# Patient Record
Sex: FEMALE | Race: WHITE | NOT HISPANIC OR LATINO | Employment: FULL TIME | URBAN - METROPOLITAN AREA
[De-identification: names, ages, dates, MRNs, and addresses within clinical notes are randomized per-mention and may not be internally consistent; named-entity substitution may affect disease eponyms.]

---
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
## 2023-08-01 ENCOUNTER — OFFICE VISIT (OUTPATIENT)
Dept: OBSTETRICS AND GYNECOLOGY | Facility: CLINIC | Age: 23
End: 2023-08-01
Payer: COMMERCIAL

## 2023-08-01 VITALS
DIASTOLIC BLOOD PRESSURE: 70 MMHG | HEART RATE: 94 BPM | WEIGHT: 179.38 LBS | SYSTOLIC BLOOD PRESSURE: 116 MMHG | HEIGHT: 65 IN | BODY MASS INDEX: 29.89 KG/M2

## 2023-08-01 DIAGNOSIS — Z30.430 ENCOUNTER FOR INSERTION OF PARAGARD IUD: ICD-10-CM

## 2023-08-01 DIAGNOSIS — Z12.4 CERVICAL CANCER SCREENING: ICD-10-CM

## 2023-08-01 DIAGNOSIS — Z01.419 ENCNTR FOR GYN EXAM (GENERAL) (ROUTINE) W/O ABN FINDINGS: Primary | ICD-10-CM

## 2023-08-01 PROCEDURE — 99999 PR PBB SHADOW E&M-EST. PATIENT-LVL III: CPT | Mod: PBBFAC,,, | Performed by: STUDENT IN AN ORGANIZED HEALTH CARE EDUCATION/TRAINING PROGRAM

## 2023-08-01 PROCEDURE — 3008F PR BODY MASS INDEX (BMI) DOCUMENTED: ICD-10-PCS | Mod: CPTII,S$GLB,, | Performed by: STUDENT IN AN ORGANIZED HEALTH CARE EDUCATION/TRAINING PROGRAM

## 2023-08-01 PROCEDURE — 99385 PR PREVENTIVE VISIT,NEW,18-39: ICD-10-PCS | Mod: S$GLB,,, | Performed by: STUDENT IN AN ORGANIZED HEALTH CARE EDUCATION/TRAINING PROGRAM

## 2023-08-01 PROCEDURE — 99999 PR PBB SHADOW E&M-EST. PATIENT-LVL III: ICD-10-PCS | Mod: PBBFAC,,, | Performed by: STUDENT IN AN ORGANIZED HEALTH CARE EDUCATION/TRAINING PROGRAM

## 2023-08-01 PROCEDURE — 3008F BODY MASS INDEX DOCD: CPT | Mod: CPTII,S$GLB,, | Performed by: STUDENT IN AN ORGANIZED HEALTH CARE EDUCATION/TRAINING PROGRAM

## 2023-08-01 PROCEDURE — 99385 PREV VISIT NEW AGE 18-39: CPT | Mod: S$GLB,,, | Performed by: STUDENT IN AN ORGANIZED HEALTH CARE EDUCATION/TRAINING PROGRAM

## 2023-08-01 PROCEDURE — 1160F RVW MEDS BY RX/DR IN RCRD: CPT | Mod: CPTII,S$GLB,, | Performed by: STUDENT IN AN ORGANIZED HEALTH CARE EDUCATION/TRAINING PROGRAM

## 2023-08-01 PROCEDURE — 3074F PR MOST RECENT SYSTOLIC BLOOD PRESSURE < 130 MM HG: ICD-10-PCS | Mod: CPTII,S$GLB,, | Performed by: STUDENT IN AN ORGANIZED HEALTH CARE EDUCATION/TRAINING PROGRAM

## 2023-08-01 PROCEDURE — 3074F SYST BP LT 130 MM HG: CPT | Mod: CPTII,S$GLB,, | Performed by: STUDENT IN AN ORGANIZED HEALTH CARE EDUCATION/TRAINING PROGRAM

## 2023-08-01 PROCEDURE — 1159F MED LIST DOCD IN RCRD: CPT | Mod: CPTII,S$GLB,, | Performed by: STUDENT IN AN ORGANIZED HEALTH CARE EDUCATION/TRAINING PROGRAM

## 2023-08-01 PROCEDURE — 3078F DIAST BP <80 MM HG: CPT | Mod: CPTII,S$GLB,, | Performed by: STUDENT IN AN ORGANIZED HEALTH CARE EDUCATION/TRAINING PROGRAM

## 2023-08-01 PROCEDURE — 1159F PR MEDICATION LIST DOCUMENTED IN MEDICAL RECORD: ICD-10-PCS | Mod: CPTII,S$GLB,, | Performed by: STUDENT IN AN ORGANIZED HEALTH CARE EDUCATION/TRAINING PROGRAM

## 2023-08-01 PROCEDURE — 88175 CYTOPATH C/V AUTO FLUID REDO: CPT | Performed by: PATHOLOGY

## 2023-08-01 PROCEDURE — 88141 CYTOPATH C/V INTERPRET: CPT | Mod: ,,, | Performed by: PATHOLOGY

## 2023-08-01 PROCEDURE — 88141 PR  CYTOPATH CERV/VAG INTERPRET: ICD-10-PCS | Mod: ,,, | Performed by: PATHOLOGY

## 2023-08-01 PROCEDURE — 3078F PR MOST RECENT DIASTOLIC BLOOD PRESSURE < 80 MM HG: ICD-10-PCS | Mod: CPTII,S$GLB,, | Performed by: STUDENT IN AN ORGANIZED HEALTH CARE EDUCATION/TRAINING PROGRAM

## 2023-08-01 PROCEDURE — 1160F PR REVIEW ALL MEDS BY PRESCRIBER/CLIN PHARMACIST DOCUMENTED: ICD-10-PCS | Mod: CPTII,S$GLB,, | Performed by: STUDENT IN AN ORGANIZED HEALTH CARE EDUCATION/TRAINING PROGRAM

## 2023-08-01 RX ORDER — ETONOGESTREL AND ETHINYL ESTRADIOL VAGINAL RING .015; .12 MG/D; MG/D
RING VAGINAL
COMMUNITY
Start: 2023-03-25 | End: 2023-11-16

## 2023-08-01 NOTE — PROGRESS NOTES
Subjective:    Patient ID: Yanira Sun is a 23 y.o. y.o. female.     Chief Complaint: Annual Well Woman Exam     History of Present Illness:  Yanira presents today for Annual Well Woman exam. She describes her menses as regular every month without intermenstrual spotting.She denies pelvic pain.  She denies breast tenderness, masses, nipple discharge. She denies difficulty with urination or bowel movements. . She is sexually active. Contraception is by no method.      Menstrual History:   Patient's last menstrual period was 2023..     OB History          0    Para   0    Term   0       0    AB   0    Living   0         SAB   0    IAB   0    Ectopic   0    Multiple   0    Live Births   0                 The following portions of the patient's history were reviewed and updated as appropriate: allergies, current medications, past family history, past medical history, past social history, past surgical history, and problem list.    ROS:   CONSTITUTIONAL: Negative for fever, chills, diaphoresis, weakness, fatigue, weight loss, weight gain  ENT: negative for sore throat, nasal congestion, nasal discharge, epistaxis, tinnitus, hearing loss  EYES: negative for blurry vision, decreased vision, loss of vision, eye pain, diplopia, photophobia, discharge  SKIN: Negative for rash, itching, hives  RESPIRATORY: negative for cough, hemoptysis, shortness of breath, pleuritic chest pain, wheezing  CARDIOVASCULAR: negative for chest pain, dyspnea on exertion, orthopnea, paroxysmal nocturnal dyspnea, edema, palpitations  BREAST: negative for breast  tenderness, breast mass, nipple discharge, or skin changes  GASTROINTESTINAL: negative for abdominal pain, flank pain, nausea, vomiting, diarrhea, constipation, black stool, blood in stool  GENITOURINARY: negative for abnormal vaginal bleeding, amenorrhea, decreased libido, dysuria, genital sores, hematuria, incontinence, menorrhagia, pelvic pain, urinary frequency,  vaginal discharge  HEMATOLOGIC/LYMPHATIC: negative for swollen lymph nodes, bleeding, bruising  MUSCULOSKELETAL: negative for back pain, joint pain, joint stiffness, joint swelling, muscle pain, muscle weakness  NEUROLOGICAL: negative for dizzy/vertigo, headache, focal weakness, numbness/tingling, speech problems, loss of consciousness, confusion, memory loss  BEHAVORIAL/PSYCH: negative for anxiety, depression, psychosis  ENDOCRINE: negative for polydipsia/polyuria, palpitations, skin changes, temperature intolerance, unexpected weight changes  ALLERGIC/IMMUNOLOGIC: negative for urticaria, hay fever, angioedema      Objective:    Vital Signs:  Vitals:    08/01/23 1306   BP: 116/70   Pulse: 94       Physical Exam:  General:  alert, cooperative, no distress   Skin:  Skin color, texture, turgor normal. No rashes or lesions   HEENT:  conjunctivae/corneas clear. PERRL.   Neck: supple, trachea midline, no adenopathy or thyromegally   Respiratory:  Normal effort   Breasts:  no discharge, erythema, tenderness, or palpable masses; no axillary lymphadenopathy   Abdomen:  soft, nontender, no palpable masses   Pelvis: External genitalia: normal general appearance  Urinary system: urethral meatus normal, bladder nontender  Vaginal: normal mucosa without prolapse or lesions  Cervix: normal appearance  Uterus: normal size, shape, position  Adnexa: normal size, nontender bilaterally   Extremities: Normal ROM; no edema, no cyanosis   Neurologial: Normal strength and tone. No focal numbness or weakness.   Psychiatric: normal mood, speech, dress, and thought processes     LABS:  No result found     A1C:      CBC:  Recent Labs   Lab 06/17/23  0354   WBC 15.53 H   RBC 5.23   Hemoglobin 15.0   Hematocrit 42.3   Platelets 423   MCV 81 L   MCH 28.7   MCHC 35.5     CMP:  Recent Labs   Lab 06/17/23  0354   Glucose 126 H   Calcium 9.7   Albumin 4.9   Total Protein 8.5 H   Sodium 140   Potassium 3.9   CO2 18 L   Chloride 108   BUN 13    Creatinine 0.59   Alkaline Phosphatase 80   ALT 29   AST 29   Total Bilirubin 0.6     LIPIDS:      TSH:        Assessment:       Healthy female exam.     1. Encntr for gyn exam (general) (routine) w/o abn findings    2. Cervical cancer screening          Plan:      Problem List Items Addressed This Visit    None  Visit Diagnoses       Encntr for gyn exam (general) (routine) w/o abn findings    -  Primary    Cervical cancer screening        Relevant Orders    Liquid-Based Pap Smear, Screening              COUNSELING:  Yanira was counseled on STD prevention, use and side-effects of various contraceptive measures, A.C.O.G. Pap guidelines and recommendations for yearly pelvic exams in addition to recommendations for monthly self breast exams; to see her PCP for other health maintenance.

## 2023-08-08 LAB
FINAL PATHOLOGIC DIAGNOSIS: NORMAL
Lab: NORMAL

## 2023-08-11 ENCOUNTER — TELEPHONE (OUTPATIENT)
Dept: OBSTETRICS AND GYNECOLOGY | Facility: CLINIC | Age: 23
End: 2023-08-11
Payer: COMMERCIAL

## 2023-08-30 ENCOUNTER — PROCEDURE VISIT (OUTPATIENT)
Dept: OBSTETRICS AND GYNECOLOGY | Facility: CLINIC | Age: 23
End: 2023-08-30
Payer: COMMERCIAL

## 2023-08-30 VITALS
WEIGHT: 181 LBS | HEIGHT: 65 IN | BODY MASS INDEX: 30.16 KG/M2 | DIASTOLIC BLOOD PRESSURE: 68 MMHG | SYSTOLIC BLOOD PRESSURE: 116 MMHG | HEART RATE: 95 BPM

## 2023-08-30 DIAGNOSIS — Z30.430 ENCOUNTER FOR INSERTION OF PARAGARD IUD: Primary | ICD-10-CM

## 2023-08-30 LAB
B-HCG UR QL: NEGATIVE
CTP QC/QA: YES

## 2023-08-30 PROCEDURE — 58300 INSERT INTRAUTERINE DEVICE: CPT | Mod: S$GLB,,, | Performed by: STUDENT IN AN ORGANIZED HEALTH CARE EDUCATION/TRAINING PROGRAM

## 2023-08-30 PROCEDURE — 99499 UNLISTED E&M SERVICE: CPT | Mod: S$GLB,,, | Performed by: STUDENT IN AN ORGANIZED HEALTH CARE EDUCATION/TRAINING PROGRAM

## 2023-08-30 PROCEDURE — 99499 NO LOS: ICD-10-PCS | Mod: S$GLB,,, | Performed by: STUDENT IN AN ORGANIZED HEALTH CARE EDUCATION/TRAINING PROGRAM

## 2023-08-30 PROCEDURE — 81025 URINE PREGNANCY TEST: CPT | Mod: S$GLB,,, | Performed by: STUDENT IN AN ORGANIZED HEALTH CARE EDUCATION/TRAINING PROGRAM

## 2023-08-30 PROCEDURE — 58300 INSERTION OF IUD: ICD-10-PCS | Mod: S$GLB,,, | Performed by: STUDENT IN AN ORGANIZED HEALTH CARE EDUCATION/TRAINING PROGRAM

## 2023-08-30 PROCEDURE — 81025 POCT URINE PREGNANCY: ICD-10-PCS | Mod: S$GLB,,, | Performed by: STUDENT IN AN ORGANIZED HEALTH CARE EDUCATION/TRAINING PROGRAM

## 2023-08-30 NOTE — PROCEDURES
Insertion of IUD    Date/Time: 8/30/2023 10:45 AM    Performed by: Latoya Villa MD  Authorized by: Latoya Villa MD    Consent:     Consent given by:  Patient    Procedure risks and benefits discussed: yes      Patient questions answered: yes      Patient agrees, verbalizes understanding, and wants to proceed: yes      Educational handouts given: yes      Instructions and paperwork completed: yes    Procedure:     Pelvic exam performed: yes      Negative serum pregnancy test: no      Speculum placed in vagina: yes      Tenaculum applied to cervix: yes      Uterus sounded: yes      Uterus sound depth (cm):  7    IUD inserted with no complications: yes      IUD type:  ParaGard    Strings trimmed: yes    380 mm copper intrauterine device 380 square mm     Post-procedure:     Patient tolerated procedure well: yes      Patient will follow up after next period: yes

## 2023-09-27 ENCOUNTER — OFFICE VISIT (OUTPATIENT)
Dept: OBSTETRICS AND GYNECOLOGY | Facility: CLINIC | Age: 23
End: 2023-09-27
Payer: COMMERCIAL

## 2023-09-27 VITALS
SYSTOLIC BLOOD PRESSURE: 118 MMHG | HEART RATE: 89 BPM | WEIGHT: 179.88 LBS | BODY MASS INDEX: 29.97 KG/M2 | HEIGHT: 65 IN | DIASTOLIC BLOOD PRESSURE: 70 MMHG

## 2023-09-27 DIAGNOSIS — Z30.431 IUD CHECK UP: Primary | ICD-10-CM

## 2023-09-27 PROCEDURE — 3008F BODY MASS INDEX DOCD: CPT | Mod: CPTII,S$GLB,, | Performed by: STUDENT IN AN ORGANIZED HEALTH CARE EDUCATION/TRAINING PROGRAM

## 2023-09-27 PROCEDURE — 3078F PR MOST RECENT DIASTOLIC BLOOD PRESSURE < 80 MM HG: ICD-10-PCS | Mod: CPTII,S$GLB,, | Performed by: STUDENT IN AN ORGANIZED HEALTH CARE EDUCATION/TRAINING PROGRAM

## 2023-09-27 PROCEDURE — 99999 PR PBB SHADOW E&M-EST. PATIENT-LVL III: ICD-10-PCS | Mod: PBBFAC,,, | Performed by: STUDENT IN AN ORGANIZED HEALTH CARE EDUCATION/TRAINING PROGRAM

## 2023-09-27 PROCEDURE — 99213 PR OFFICE/OUTPT VISIT, EST, LEVL III, 20-29 MIN: ICD-10-PCS | Mod: S$GLB,,, | Performed by: STUDENT IN AN ORGANIZED HEALTH CARE EDUCATION/TRAINING PROGRAM

## 2023-09-27 PROCEDURE — 1159F MED LIST DOCD IN RCRD: CPT | Mod: CPTII,S$GLB,, | Performed by: STUDENT IN AN ORGANIZED HEALTH CARE EDUCATION/TRAINING PROGRAM

## 2023-09-27 PROCEDURE — 3074F PR MOST RECENT SYSTOLIC BLOOD PRESSURE < 130 MM HG: ICD-10-PCS | Mod: CPTII,S$GLB,, | Performed by: STUDENT IN AN ORGANIZED HEALTH CARE EDUCATION/TRAINING PROGRAM

## 2023-09-27 PROCEDURE — 3078F DIAST BP <80 MM HG: CPT | Mod: CPTII,S$GLB,, | Performed by: STUDENT IN AN ORGANIZED HEALTH CARE EDUCATION/TRAINING PROGRAM

## 2023-09-27 PROCEDURE — 99999 PR PBB SHADOW E&M-EST. PATIENT-LVL III: CPT | Mod: PBBFAC,,, | Performed by: STUDENT IN AN ORGANIZED HEALTH CARE EDUCATION/TRAINING PROGRAM

## 2023-09-27 PROCEDURE — 1159F PR MEDICATION LIST DOCUMENTED IN MEDICAL RECORD: ICD-10-PCS | Mod: CPTII,S$GLB,, | Performed by: STUDENT IN AN ORGANIZED HEALTH CARE EDUCATION/TRAINING PROGRAM

## 2023-09-27 PROCEDURE — 3074F SYST BP LT 130 MM HG: CPT | Mod: CPTII,S$GLB,, | Performed by: STUDENT IN AN ORGANIZED HEALTH CARE EDUCATION/TRAINING PROGRAM

## 2023-09-27 PROCEDURE — 99213 OFFICE O/P EST LOW 20 MIN: CPT | Mod: S$GLB,,, | Performed by: STUDENT IN AN ORGANIZED HEALTH CARE EDUCATION/TRAINING PROGRAM

## 2023-09-27 PROCEDURE — 3008F PR BODY MASS INDEX (BMI) DOCUMENTED: ICD-10-PCS | Mod: CPTII,S$GLB,, | Performed by: STUDENT IN AN ORGANIZED HEALTH CARE EDUCATION/TRAINING PROGRAM

## 2023-09-27 NOTE — PROGRESS NOTES
CC: IUD check    Yanira Sun is a 23 y.o. female  presents for IUD check.  Patient had a Paragard placed 4 week(s) ago.  She is not having problems with bleeding, cramping, fever or discharge.  She is able to feel the strings.      PHYSICAL EXAM:  Vitals:    23 1101   BP: 118/70   Pulse: 89         GENERAL: alert, appears stated age, and cooperative  ABDOMEN:  Normal, benign.  EXTERNAL GENITALIA: normal appearing vulva with no masses, tenderness or lesions  URETHRA:  Normal  URETHRAL MEATUS:  Normal  VAGINA:  not evaluated  CERVIX: nulliparous appearance IUD strings are visible.  IUD strings are palpable.    UTERUS:  nonenlarged  ADNEXA:  not evaluated    ASSESSMENT AND PLAN:  IUD check    Patient counseled on IUD danger signs and how to check the strings reviewed.

## 2023-11-16 ENCOUNTER — TELEPHONE (OUTPATIENT)
Dept: OBSTETRICS AND GYNECOLOGY | Facility: CLINIC | Age: 23
End: 2023-11-16
Payer: COMMERCIAL

## 2023-11-16 ENCOUNTER — OFFICE VISIT (OUTPATIENT)
Dept: OBSTETRICS AND GYNECOLOGY | Facility: CLINIC | Age: 23
End: 2023-11-16
Payer: COMMERCIAL

## 2023-11-16 VITALS
HEART RATE: 85 BPM | BODY MASS INDEX: 30.91 KG/M2 | WEIGHT: 185.5 LBS | SYSTOLIC BLOOD PRESSURE: 118 MMHG | DIASTOLIC BLOOD PRESSURE: 68 MMHG | HEIGHT: 65 IN

## 2023-11-16 DIAGNOSIS — N94.6 DYSMENORRHEA: Primary | ICD-10-CM

## 2023-11-16 PROCEDURE — 99999 PR PBB SHADOW E&M-EST. PATIENT-LVL III: CPT | Mod: PBBFAC,,, | Performed by: STUDENT IN AN ORGANIZED HEALTH CARE EDUCATION/TRAINING PROGRAM

## 2023-11-16 PROCEDURE — 3074F PR MOST RECENT SYSTOLIC BLOOD PRESSURE < 130 MM HG: ICD-10-PCS | Mod: CPTII,S$GLB,, | Performed by: STUDENT IN AN ORGANIZED HEALTH CARE EDUCATION/TRAINING PROGRAM

## 2023-11-16 PROCEDURE — 3078F PR MOST RECENT DIASTOLIC BLOOD PRESSURE < 80 MM HG: ICD-10-PCS | Mod: CPTII,S$GLB,, | Performed by: STUDENT IN AN ORGANIZED HEALTH CARE EDUCATION/TRAINING PROGRAM

## 2023-11-16 PROCEDURE — 3008F PR BODY MASS INDEX (BMI) DOCUMENTED: ICD-10-PCS | Mod: CPTII,S$GLB,, | Performed by: STUDENT IN AN ORGANIZED HEALTH CARE EDUCATION/TRAINING PROGRAM

## 2023-11-16 PROCEDURE — 99213 PR OFFICE/OUTPT VISIT, EST, LEVL III, 20-29 MIN: ICD-10-PCS | Mod: S$GLB,,, | Performed by: STUDENT IN AN ORGANIZED HEALTH CARE EDUCATION/TRAINING PROGRAM

## 2023-11-16 PROCEDURE — 99213 OFFICE O/P EST LOW 20 MIN: CPT | Mod: S$GLB,,, | Performed by: STUDENT IN AN ORGANIZED HEALTH CARE EDUCATION/TRAINING PROGRAM

## 2023-11-16 PROCEDURE — 1159F MED LIST DOCD IN RCRD: CPT | Mod: CPTII,S$GLB,, | Performed by: STUDENT IN AN ORGANIZED HEALTH CARE EDUCATION/TRAINING PROGRAM

## 2023-11-16 PROCEDURE — 3008F BODY MASS INDEX DOCD: CPT | Mod: CPTII,S$GLB,, | Performed by: STUDENT IN AN ORGANIZED HEALTH CARE EDUCATION/TRAINING PROGRAM

## 2023-11-16 PROCEDURE — 3074F SYST BP LT 130 MM HG: CPT | Mod: CPTII,S$GLB,, | Performed by: STUDENT IN AN ORGANIZED HEALTH CARE EDUCATION/TRAINING PROGRAM

## 2023-11-16 PROCEDURE — 3078F DIAST BP <80 MM HG: CPT | Mod: CPTII,S$GLB,, | Performed by: STUDENT IN AN ORGANIZED HEALTH CARE EDUCATION/TRAINING PROGRAM

## 2023-11-16 PROCEDURE — 1159F PR MEDICATION LIST DOCUMENTED IN MEDICAL RECORD: ICD-10-PCS | Mod: CPTII,S$GLB,, | Performed by: STUDENT IN AN ORGANIZED HEALTH CARE EDUCATION/TRAINING PROGRAM

## 2023-11-16 PROCEDURE — 99999 PR PBB SHADOW E&M-EST. PATIENT-LVL III: ICD-10-PCS | Mod: PBBFAC,,, | Performed by: STUDENT IN AN ORGANIZED HEALTH CARE EDUCATION/TRAINING PROGRAM

## 2023-11-16 NOTE — PROGRESS NOTES
Subjective:       Patient ID: Yanira Sun is a 23 y.o. female.    Chief Complaint:  Dysmenorrhea (Pt states periods are very painful )      History of Present Illness  Patient is a 22 yo  presenting with worsening dysmenorrhea. She reports that initially Paragard was causing heavier bleeding did not affect her cramps. She is now having severe pain with her periods. She has concerns about starting hormonal birth control again due to increased depression with oral medication.           GYN & OB History  Patient's last menstrual period was 2023.   Date of Last Pap: 2023    OB History    Para Term  AB Living   0 0 0 0 0 0   SAB IAB Ectopic Multiple Live Births   0 0 0 0 0       Review of Systems  Review of Systems   Constitutional:  Negative for chills, fatigue, fever and unexpected weight change.   HENT:  Negative for congestion, sinus pain and sore throat.    Eyes:  Negative for visual disturbance.   Respiratory:  Negative for cough, chest tightness and shortness of breath.    Cardiovascular:  Negative for chest pain and palpitations.   Gastrointestinal:  Negative for abdominal pain, constipation, diarrhea, nausea and vomiting.   Genitourinary:  Positive for menstrual problem. Negative for vaginal bleeding, vaginal discharge and vaginal pain.   Musculoskeletal:  Negative for myalgias.   Skin:  Negative for color change, pallor and rash.   Neurological:  Negative for dizziness, light-headedness and headaches.   Psychiatric/Behavioral:  The patient is not nervous/anxious.            Objective:    Physical Exam:   Constitutional: She is oriented to person, place, and time. She appears well-developed and well-nourished. No distress.    HENT:   Head: Normocephalic and atraumatic.    Eyes: Pupils are equal, round, and reactive to light. EOM are normal.     Cardiovascular:  Normal rate.             Pulmonary/Chest: Effort normal. No respiratory distress.                      Neurological: She  is alert and oriented to person, place, and time.    Skin: Skin is warm and dry. No erythema.    Psychiatric: She has a normal mood and affect. Her behavior is normal. Judgment and thought content normal.          Assessment:        1. Dysmenorrhea                Plan:      Yanira was seen today for dysmenorrhea.    Diagnoses and all orders for this visit:    Dysmenorrhea  -     Device Authorization Order

## 2024-02-02 ENCOUNTER — OFFICE VISIT (OUTPATIENT)
Dept: URGENT CARE | Facility: CLINIC | Age: 24
End: 2024-02-02
Payer: COMMERCIAL

## 2024-02-02 VITALS
DIASTOLIC BLOOD PRESSURE: 63 MMHG | HEART RATE: 71 BPM | TEMPERATURE: 98 F | OXYGEN SATURATION: 99 % | HEIGHT: 65 IN | WEIGHT: 185 LBS | SYSTOLIC BLOOD PRESSURE: 110 MMHG | RESPIRATION RATE: 18 BRPM | BODY MASS INDEX: 30.82 KG/M2

## 2024-02-02 DIAGNOSIS — R68.89 FLU-LIKE SYMPTOMS: ICD-10-CM

## 2024-02-02 DIAGNOSIS — B34.9 ACUTE VIRAL SYNDROME: Primary | ICD-10-CM

## 2024-02-02 DIAGNOSIS — R50.9 FEVER, UNSPECIFIED FEVER CAUSE: ICD-10-CM

## 2024-02-02 DIAGNOSIS — R05.9 COUGH, UNSPECIFIED TYPE: ICD-10-CM

## 2024-02-02 LAB
CTP QC/QA: YES
CTP QC/QA: YES
POC MOLECULAR INFLUENZA A AGN: NEGATIVE
POC MOLECULAR INFLUENZA B AGN: NEGATIVE
SARS-COV-2 AG RESP QL IA.RAPID: NEGATIVE

## 2024-02-02 PROCEDURE — 87502 INFLUENZA DNA AMP PROBE: CPT | Mod: QW,S$GLB,, | Performed by: PHYSICIAN ASSISTANT

## 2024-02-02 PROCEDURE — 87811 SARS-COV-2 COVID19 W/OPTIC: CPT | Mod: QW,S$GLB,, | Performed by: PHYSICIAN ASSISTANT

## 2024-02-02 PROCEDURE — 99203 OFFICE O/P NEW LOW 30 MIN: CPT | Mod: S$GLB,,, | Performed by: PHYSICIAN ASSISTANT

## 2024-02-02 NOTE — PATIENT INSTRUCTIONS

## 2024-02-02 NOTE — LETTER
February 2, 2024      [facility]  [id number], [address]  [place] [address]  Phone: [phone]  Fax: [phone]       Patient: Yanira Sun   YOB: 2000  Date of Visit: 02/02/2024    To Whom It May Concern:    Marybel Sun  was at [facility] on 02/02/2024. She may return to work/school on 2/4/2024 OR once fever free for 24 hours without the use of fever reducing medications. If you have any questions or concerns, or if I can be of further assistance, please do not hesitate to contact me.    Sincerely,    Huong Meyers PA-C

## 2024-02-02 NOTE — PROGRESS NOTES
"Subjective:      Patient ID: Yanira Sun is a 23 y.o. female.    Vitals:  height is 5' 5" (1.651 m) and weight is 83.9 kg (185 lb). Her oral temperature is 98.1 °F (36.7 °C). Her blood pressure is 110/63 and her pulse is 71. Her respiration is 18 and oxygen saturation is 99%.     Chief Complaint: Sinus Problem    Pt complains of cough, nasal congestion, fever and headache that started this morning. Her highest fever was 103.0. Pt was exposed to covid and flu    Patient provider note starts here:  Patient presents with complaints of nasal congestion, rhinorrhea, headache and fever. Reports that Tmax 103 today. She has been taking Felicia [place] for the symptoms. Of note, she was exposed to both flu and COVID recently. Denies chest pain or SOB. Requesting work excuse.     Sinus Problem  This is a new problem. The current episode started today. The problem has been gradually worsening since onset. The maximum temperature recorded prior to her arrival was 103 - 104 F. The fever has been present for Less than 1 day. Her pain is at a severity of 7/10 (headache). Associated symptoms include congestion, coughing and headaches. Pertinent negatives include no neck pain or sore throat. Treatments tried: felicia seltzer cold and flu. The treatment provided mild relief.       Constitution: Positive for fatigue and fever (highest temp 103.0).   HENT:  Positive for congestion and postnasal drip. Negative for sore throat.    Neck: Negative for neck pain.   Cardiovascular:  Negative for chest pain, palpitations and sob on exertion.   Respiratory:  Positive for cough. Negative for chest tightness, sputum production and wheezing.    Gastrointestinal:  Positive for nausea. Negative for abdominal pain, vomiting and diarrhea.   Skin:  Negative for color change and wound.   Neurological:  Positive for headaches. Negative for numbness and tingling.      Objective:     Physical Exam   Constitutional: She is oriented to person, place, and time. " She appears well-developed. She is cooperative.  Non-toxic appearance. She does not appear ill. No distress.   HENT:   Head: Normocephalic and atraumatic.   Ears:   Right Ear: Hearing, tympanic membrane, external ear and ear canal normal.   Left Ear: Hearing, tympanic membrane, external ear and ear canal normal.   Nose: Congestion present. No mucosal edema, rhinorrhea or nasal deformity. No epistaxis. Right sinus exhibits no maxillary sinus tenderness and no frontal sinus tenderness. Left sinus exhibits no maxillary sinus tenderness and no frontal sinus tenderness.   Mouth/Throat: Uvula is midline and mucous membranes are normal. No trismus in the jaw. Normal dentition. No uvula swelling. No oropharyngeal exudate, posterior oropharyngeal edema or posterior oropharyngeal erythema.   Eyes: Conjunctivae and lids are normal. No scleral icterus.   Neck: Trachea normal and phonation normal. Neck supple. No edema present. No erythema present. No neck rigidity present.   Cardiovascular: Normal rate, regular rhythm, normal heart sounds and normal pulses.   Pulmonary/Chest: Effort normal and breath sounds normal. No respiratory distress. She has no decreased breath sounds. She has no wheezes. She has no rhonchi.   Abdominal: Normal appearance.   Musculoskeletal: Normal range of motion.         General: No deformity. Normal range of motion.   Neurological: She is alert and oriented to person, place, and time. She exhibits normal muscle tone. Coordination normal.   Skin: Skin is warm, dry, intact, not diaphoretic and not pale.   Psychiatric: Her speech is normal and behavior is normal. Judgment and thought content normal.   Nursing note and vitals reviewed.      Assessment:     1. Acute viral syndrome    2. Cough, unspecified type    3. Fever, unspecified fever cause    4. Flu-like symptoms      Results for orders placed or performed in visit on 02/02/24   SARS Coronavirus 2 Antigen, POCT Manual Read   Result Value Ref Range     SARS Coronavirus 2 Antigen Negative Negative     Acceptable Yes    POCT Influenza A/B MOLECULAR   Result Value Ref Range    POC Molecular Influenza A Ag Negative Negative, Not Reported    POC Molecular Influenza B Ag Negative Negative, Not Reported     Acceptable Yes        Plan:       Acute viral syndrome    Cough, unspecified type  -     SARS Coronavirus 2 Antigen, POCT Manual Read  -     POCT Influenza A/B MOLECULAR    Fever, unspecified fever cause    Flu-like symptoms          Medical Decision Making:   History:   Old Medical Records: I decided to obtain old medical records.  Clinical Tests:   Lab Tests: Ordered and Reviewed  Urgent Care Management:  A. Problem List:   -Acute: Fever, acute viral syndrome    -Chronic: None  B. Differential diagnosis: viral vs bacterial URI, pharyngitis, otitis, COVID 19, influenza, pneumonia  C. Diagnostic Testing Ordered: Flu, COVID   D. Diagnostic Testing Considered: None  E. Independent Historians: None  F. Urgent Care Midlevel Independent Results Interpretation: COVID negative, Flu negative  G. Radiology:  H. Review of Previous Medical Records:  I. Home Medications Reviewed  J. Social Determinants of Health considered  K. Medical Decision Making and Disposition: Patient presents with complaints of flu like symptoms including fever since this morning. On exam, she is afebrile and nontoxic appearing. Lungs CTAB, vitals are stable. COVID and Flu both negative. Likely other viral etiology but also discussed that it may be too early to test positive for either at this time. The risks and benefits of Tamiflu were discussed with the patient and she declined to take Tamiflu at this time. ED precautions discussed and advised close follow-up with PCP. She verbalized understanding and agreed with plan.          Patient Instructions   Thank you for choosing [facility]!     Our goal in the Urgent Care is to always provide outstanding medical care.  You may receive a survey by mail or e-mail in the next week regarding your experience today. We would greatly appreciate you completing and returning the survey. Your feedback provides us with a way to recognize our staff who provide very good care, and it helps us learn how to improve when your experience was below our aspiration of excellence.       We appreciate you trusting us with your medical care. We hope you feel better soon. We will be happy to take care of you for all of your future medical needs.    You must understand that you've received an Urgent Care treatment only and that you may be released before all your medical problems are known or treated. You, the patient, will arrange for follow up care as instructed.      Follow up with your PCP or specialty clinic as instructed in the next 2-3 days if not improved or as needed. You can call [phone] to schedule an appointment with appropriate provider.      If you condition worsens, we recommend that you receive another evaluation at the emergency room immediately or contact your primary medical clinic's after hours call service to discuss your concerns.      Please return here or go to the Emergency Department for any concerns or worsening condition.               Xeljanz Counseling: I discussed with the patient the risks of Xeljanz therapy including increased risk of infection, liver issues, headache, diarrhea, or cold symptoms. Live vaccines should be avoided. They were instructed to call if they have any problems.

## 2025-01-09 ENCOUNTER — OFFICE VISIT (OUTPATIENT)
Dept: URGENT CARE | Facility: CLINIC | Age: 25
End: 2025-01-09
Payer: COMMERCIAL

## 2025-01-09 VITALS
OXYGEN SATURATION: 99 % | TEMPERATURE: 98 F | DIASTOLIC BLOOD PRESSURE: 80 MMHG | HEART RATE: 89 BPM | RESPIRATION RATE: 18 BRPM | WEIGHT: 198 LBS | HEIGHT: 65 IN | SYSTOLIC BLOOD PRESSURE: 120 MMHG | BODY MASS INDEX: 32.99 KG/M2

## 2025-01-09 DIAGNOSIS — R50.9 FEVER, UNSPECIFIED FEVER CAUSE: Primary | ICD-10-CM

## 2025-01-09 DIAGNOSIS — R68.89 FLU-LIKE SYMPTOMS: ICD-10-CM

## 2025-01-09 LAB
CTP QC/QA: YES
POC MOLECULAR INFLUENZA A AGN: NEGATIVE
POC MOLECULAR INFLUENZA B AGN: NEGATIVE
POC RSV RAPID ANT MOLECULAR: NEGATIVE
SARS-COV-2 AG RESP QL IA.RAPID: NEGATIVE

## 2025-01-09 PROCEDURE — 87634 RSV DNA/RNA AMP PROBE: CPT | Mod: QW,S$GLB,, | Performed by: PHYSICIAN ASSISTANT

## 2025-01-09 PROCEDURE — 87502 INFLUENZA DNA AMP PROBE: CPT | Mod: QW,S$GLB,, | Performed by: PHYSICIAN ASSISTANT

## 2025-01-09 RX ORDER — BROMPHENIRAMINE MALEATE, PSEUDOEPHEDRINE HYDROCHLORIDE, AND DEXTROMETHORPHAN HYDROBROMIDE 2; 30; 10 MG/5ML; MG/5ML; MG/5ML
10 SYRUP ORAL EVERY 6 HOURS PRN
Qty: 118 ML | Refills: 0 | Status: SHIPPED | OUTPATIENT
Start: 2025-01-09 | End: 2025-01-19

## 2025-01-09 RX ORDER — BALOXAVIR MARBOXIL 40 MG/1
80 TABLET, FILM COATED ORAL ONCE
Qty: 2 TABLET | Refills: 0 | Status: SHIPPED | OUTPATIENT
Start: 2025-01-09 | End: 2025-01-09

## 2025-01-09 RX ORDER — OSELTAMIVIR PHOSPHATE 75 MG/1
75 CAPSULE ORAL 2 TIMES DAILY
Qty: 10 CAPSULE | Refills: 0 | Status: SHIPPED | OUTPATIENT
Start: 2025-01-09 | End: 2025-01-14

## 2025-01-09 NOTE — PROGRESS NOTES
"Subjective:      Patient ID: Yanira Sun is a 24 y.o. female.    Vitals:  height is 5' 5" (1.651 m) and weight is 89.8 kg (198 lb). Her oral temperature is 98.2 °F (36.8 °C). Her blood pressure is 120/80 and her pulse is 89. Her respiration is 18 and oxygen saturation is 99%.     Chief Complaint: Cough    Pt complains of sinus problem (burning in nose), fever and congestion.     Patient provider note starts here:  Patient presents with complaints of nasal congestion, sore throat, cough, body aches, fever Tmax 101 and body aches. All of these symptoms started today all of a sudden. She is a  worker and has been exposed to COVID, Flu, RSV and other illnesses recently. She took Tylenol for her symptoms.     Cough  This is a new problem. The current episode started today. The problem has been unchanged. The problem occurs constantly. The cough is Non-productive. Associated symptoms include chills, a fever, myalgias and nasal congestion. Pertinent negatives include no chest pain, sore throat, shortness of breath or wheezing. Nothing aggravates the symptoms. Treatments tried: tyleno. The treatment provided no relief.     Constitution: Positive for chills and fever.   HENT:  Negative for sore throat.    Neck: Negative for neck pain.   Cardiovascular:  Negative for chest pain, palpitations and sob on exertion.   Respiratory:  Positive for cough. Negative for chest tightness, sputum production, shortness of breath and wheezing.    Gastrointestinal:  Negative for abdominal pain, vomiting and diarrhea.   Musculoskeletal:  Positive for muscle ache.   Skin:  Negative for color change and wound.   Neurological:  Negative for numbness and tingling.      Objective:     Physical Exam   Constitutional: She is oriented to person, place, and time. She appears well-developed. She is cooperative.  Non-toxic appearance. She appears ill. No distress.   HENT:   Head: Normocephalic and atraumatic.   Ears:   Right Ear: Hearing, " tympanic membrane, external ear and ear canal normal.   Left Ear: Hearing, tympanic membrane, external ear and ear canal normal.   Nose: Congestion present. No mucosal edema, rhinorrhea or nasal deformity. No epistaxis. Right sinus exhibits no maxillary sinus tenderness and no frontal sinus tenderness. Left sinus exhibits no maxillary sinus tenderness and no frontal sinus tenderness.   Mouth/Throat: Uvula is midline and mucous membranes are normal. No trismus in the jaw. Normal dentition. No uvula swelling. Posterior oropharyngeal erythema present. No oropharyngeal exudate or posterior oropharyngeal edema.   Eyes: Conjunctivae and lids are normal. No scleral icterus.   Neck: Trachea normal and phonation normal. Neck supple. No edema present. No erythema present. No neck rigidity present.   Cardiovascular: Normal rate, regular rhythm, normal heart sounds and normal pulses.   Pulmonary/Chest: Effort normal and breath sounds normal. No respiratory distress. She has no decreased breath sounds. She has no wheezes. She has no rhonchi.   Abdominal: Normal appearance.   Musculoskeletal: Normal range of motion.         General: No deformity. Normal range of motion.   Neurological: She is alert and oriented to person, place, and time. She exhibits normal muscle tone. Coordination normal.   Skin: Skin is warm, dry, intact, not diaphoretic and not pale.   Psychiatric: Her speech is normal and behavior is normal. Judgment and thought content normal.   Nursing note and vitals reviewed.      Assessment:     1. Fever, unspecified fever cause    2. Flu-like symptoms      Results for orders placed or performed in visit on 01/09/25   POCT Influenza A/B Molecular    Collection Time: 01/09/25  6:17 PM   Result Value Ref Range    POC Molecular Influenza A Ag Negative Negative    POC Molecular Influenza B Ag Negative Negative     Acceptable Yes    POCT RSV by Molecular    Collection Time: 01/09/25  6:53 PM   Result Value  Ref Range    POC RSV Rapid Ant Molecular Negative Negative     Acceptable Yes    SARS Coronavirus 2 Antigen, POCT Manual Read    Collection Time: 01/09/25  6:54 PM   Result Value Ref Range    SARS Coronavirus 2 Antigen Negative Negative     Acceptable Yes        Plan:       Fever, unspecified fever cause  -     POCT Influenza A/B Molecular  -     POCT RSV by Molecular  -     SARS Coronavirus 2 Antigen, POCT Manual Read    Flu-like symptoms  -     baloxavir marboxiL (XOFLUZA) 40 mg tablet; Take 2 tablets (80 mg total) by mouth once. for 1 dose  Dispense: 2 tablet; Refill: 0  -     oseltamivir (TAMIFLU) 75 MG capsule; Take 1 capsule (75 mg total) by mouth 2 (two) times daily. for 5 days  Dispense: 10 capsule; Refill: 0  -     brompheniramine-pseudoeph-DM (BROMFED DM) 2-30-10 mg/5 mL Syrp; Take 10 mLs by mouth every 6 (six) hours as needed (Cough and congestion).  Dispense: 118 mL; Refill: 0          Medical Decision Making:   History:   Old Medical Records: I decided to obtain old medical records.  Old Records Summarized: records from clinic visits.  Clinical Tests:   Lab Tests: Ordered and Reviewed  Urgent Care Management:  A. Problem List:   -Acute: Flu like symptoms    -Chronic: None  B. Differential diagnosis: viral vs bacterial URI, pharyngitis, otitis, COVID 19, influenza, pneumonia  C. Diagnostic Testing Ordered: Flu, COVID, RSV   D. Diagnostic Testing Considered: None  E. Independent Historians: None  F. Urgent Care Midlevel Independent Results Interpretation: Flu negative, COVID negative, RSV negative  G. Radiology:  H. Review of Previous Medical Records:  I. Home Medications Reviewed  J. Social Determinants of Health considered  K. Medical Decision Making and Disposition:   Patient presents to the clinic with complaints of flu-like symptoms including fever, body aches, chills and URI like symptoms since today.  Works in a  and is exposed to numerous known ill contacts.   On exam, she is afebrile and nontoxic in appearance.  Lungs are clear to auscultation bilaterally and vital signs are stable.  She does appear to not feel well.  Her flu, COVID and RSV tests are all negative however, I had a shared decision-making conversation with the patient in regards to the CDC's recommendations to not hold back treatment for flu if in the treatment window and high suspicion for influenza due to the high insensitivity rates of the flu tests. Treatment options including Xofluza and Tamiflu were discussed with the patient.  I provided a printed prescription for both and advised patient to see if the Xofluza is cost effective for mary if not, she has the Tamiflu prescription to then fill.  Advised symptomatic treatment and close follow-up with primary care and strict ED precautions discussed as well.  She verbalized understanding and agreed with this plan.        Patient Instructions   You have been diagnosed with Flu or Flu-like illness.   Per the CDC website:  Flu is a contagious respiratory illness that can cause fever, cough, sore throat, runny or stuffy nose, body aches, headache, chills and/or fatigue.  There are multiple tests that can detect flu viruses.  Although testing is the only way to know if you have flu, your doctor may diagnose you based on your symptoms.  While your doctor may test you for flu, not everyone who goes to the doctor with flu-like symptoms will be tested. After evaluating you, your doctor may choose to diagnose you with flu without the need for testing based on your symptoms and his or her own clinical judgement.     If you get sick with flu, flu antiviral drugs may be a treatment option.  Antiviral drugs can make illness milder and shorten the time you are sick. They might also prevent some flu complications, like pneumonia.  Flu antiviral drugs work best when started early, ideally within two days after your flu symptoms begin.  Antibiotics will not treat the flu.  When antibiotics aren't needed, they won't help you, and their side effects could still cause harm.    Side effects vary for each medication. The most common side effects for oseltamivir (Tamiflu) are nausea and vomiting. Other less common side effects have also been reported.    Stay home and avoid contact with other people except to get medical care. You can go back to your normal activities when, for at least 24 hours, both are true:  Your symptoms are getting better overall, and  You have not had a fever (and are not using fever-reducing medication).*    To protect yourself this respiratory virus season:  Flu, COVID-19, and RSV illnesses are at low levels right now overall in the [place], but we expect them to increase in the coming weeks, as they usually do in fall and winter.  Individuals, families, healthcare providers, and health departments can all take actions to help prevent serious respiratory illnesses.  The most important action is getting the immunizations recommended for you.  If you do get sick, these immunizations can make your illness less severe and can help you avoid hospitalization.    *All of the above information was taken directly from the CDC website*

## 2025-01-09 NOTE — LETTER
January 9, 2025      [facility]  [address] [address], [address]  [place] [address]  Phone: [phone]  Fax: [phone]       Patient: Yanira Sun   YOB: 2000  Date of Visit: 01/09/2025    To Whom It May Concern:    Marybel Sun  was at [facility] on 01/09/2025. She may return to work/school on 1/13/2025 with no restrictions. If you have any questions or concerns, or if I can be of further assistance, please do not hesitate to contact me.    Sincerely,    Huong Meyers PA-C            negative...

## 2025-01-10 NOTE — PATIENT INSTRUCTIONS
You have been diagnosed with Flu or Flu-like illness.   Per the CDC website:  Flu is a contagious respiratory illness that can cause fever, cough, sore throat, runny or stuffy nose, body aches, headache, chills and/or fatigue.  There are multiple tests that can detect flu viruses.  Although testing is the only way to know if you have flu, your doctor may diagnose you based on your symptoms.  While your doctor may test you for flu, not everyone who goes to the doctor with flu-like symptoms will be tested. After evaluating you, your doctor may choose to diagnose you with flu without the need for testing based on your symptoms and his or her own clinical judgement.     If you get sick with flu, flu antiviral drugs may be a treatment option.  Antiviral drugs can make illness milder and shorten the time you are sick. They might also prevent some flu complications, like pneumonia.  Flu antiviral drugs work best when started early, ideally within two days after your flu symptoms begin.  Antibiotics will not treat the flu. When antibiotics aren't needed, they won't help you, and their side effects could still cause harm.    Side effects vary for each medication. The most common side effects for oseltamivir (Tamiflu) are nausea and vomiting. Other less common side effects have also been reported.    Stay home and avoid contact with other people except to get medical care. You can go back to your normal activities when, for at least 24 hours, both are true:  Your symptoms are getting better overall, and  You have not had a fever (and are not using fever-reducing medication).*    To protect yourself this respiratory virus season:  Flu, COVID-19, and RSV illnesses are at low levels right now overall in the [place], but we expect them to increase in the coming weeks, as they usually do in fall and winter.  Individuals, families, healthcare providers, and health departments can all take actions to help prevent serious  respiratory illnesses.  The most important action is getting the immunizations recommended for you.  If you do get sick, these immunizations can make your illness less severe and can help you avoid hospitalization.    *All of the above information was taken directly from the CDC website*

## 2025-01-17 ENCOUNTER — ON-DEMAND VIRTUAL (OUTPATIENT)
Dept: URGENT CARE | Facility: CLINIC | Age: 25
End: 2025-01-17
Payer: COMMERCIAL

## 2025-01-17 VITALS — BODY MASS INDEX: 31.62 KG/M2 | WEIGHT: 190 LBS | TEMPERATURE: 103 F

## 2025-01-17 DIAGNOSIS — J11.1 INFLUENZA: Primary | ICD-10-CM

## 2025-01-17 RX ORDER — BALOXAVIR MARBOXIL 40 MG/1
80 TABLET, FILM COATED ORAL ONCE
Qty: 2 TABLET | Refills: 0 | Status: SHIPPED | OUTPATIENT
Start: 2025-01-17 | End: 2025-01-17

## 2025-01-18 NOTE — PROGRESS NOTES
Subjective:      Patient ID: Yanira Sun is a 24 y.o. female.    Vitals:  weight is 86.2 kg (190 lb). Her temperature is 103 °F (39.4 °C) (abnormal).     Chief Complaint: Fever (Diagnosed with influenza last week and treated with tamiflu.  Tamiflu caused N/V so she stopped taking it.  Now has return of fever, headache, body aches, and cough, feeling worse than previous.  Pt states she did not test positive for influenza but was treated based on symptoms.  She works at a .)      Visit Type: TELE AUDIOVISUAL    Present with the patient at the time of consultation: TELEMED PRESENT WITH PATIENT: None    No past medical history on file.  No past surgical history on file.  Review of patient's allergies indicates:  No Known Allergies  Current Outpatient Medications on File Prior to Visit   Medication Sig Dispense Refill    brompheniramine-pseudoeph-DM (BROMFED DM) 2-30-10 mg/5 mL Syrp Take 10 mLs by mouth every 6 (six) hours as needed (Cough and congestion). 118 mL 0    montelukast (SINGULAIR) 10 mg tablet Take 10 mg by mouth every evening. (Patient not taking: Reported on 1/9/2025)       Current Facility-Administered Medications on File Prior to Visit   Medication Dose Route Frequency Provider Last Rate Last Admin    copper intrauterine device 380 square mm  mm  380 mm Intrauterine  Latoya Villa MD   380 mm at 08/30/23 1045     Family History   Problem Relation Name Age of Onset    No Known Problems Mother      No Known Problems Father      Endometriosis Paternal Aunt      Endometriosis Paternal Grandmother      Endometriosis Cousin      Breast cancer Neg Hx      Colon cancer Neg Hx      Ovarian cancer Neg Hx      Cervical cancer Neg Hx         Medications Ordered                [id number] [place] - [address]   [address] [place] [address]    Telephone: [phone]   Fax: [phone]   Hours: Not open 24 hours                         E-Prescribed (1 of 1)               baloxavir marboxiL (XOFLUZA) 40 mg tablet    Sig: Take 2 tablets (80 mg total) by mouth once. for 1 dose       Start: 1/17/25     Quantity: 2 tablet Refills: 0                           Ohs Peq Odvv Intake    1/17/2025  7:33 PM CST - Filed by Patient   What is your current physical address in the event of a medical emergency? [address] [place], [place]   Are you able to take your vital signs? No   Please attach any relevant images or files    Is your employer contracted with [facility]? No         HPI     Fever     Additional comments: Diagnosed with influenza last week and treated with tamiflu.  Tamiflu caused N/V so she stopped taking it.  Now has return of fever, headache, body aches, and cough, feeling worse than previous.  Pt states she did not test positive for influenza but was treated based on symptoms.  She works at a .  Two patient identifiers were used-name was repeated verbally as well as date of birth.  The patient was located in their home in the [facility].          Constitution: Positive for chills and fever.   HENT:  Positive for congestion, postnasal drip and sinus pressure.    Respiratory:  Positive for cough. Negative for shortness of breath.    Neurological:  Positive for headaches.        Objective:   The physical exam was conducted virtually.  Physical Exam   Constitutional: She is oriented to person, place, and time. She appears ill. No distress.   HENT:   Head: Normocephalic and atraumatic.   Nose: Rhinorrhea present.   Neck: Neck supple.   Pulmonary/Chest: Effort normal. No respiratory distress.   Musculoskeletal: Normal range of motion.         General: Normal range of motion.   Neurological: no focal deficit. She is alert and oriented to person, place, and time.   Skin: Skin is not pale.   Psychiatric: Her behavior is normal. Mood, judgment and thought content normal.       Assessment:     1. Influenza        Plan:        Influenza    Other orders  -     baloxavir marboxiL (XOFLUZA) 40 mg tablet; Take 2 tablets (80 mg total) by mouth once. for 1 dose  Dispense: 2 tablet; Refill: 0      Push fluids; tylenol or ibuprofen as needed for fever or discomfort.  F/u with PCP; to ER for worsening of symptoms.    You must understand that you've received a virtual Care treatment only and that you may be released before all your medical problems are known or treated. You, the patient, will arrange for follow up care as instructed.  If your condition worsens we recommend that you receive another evaluation at an urgent care in person, the emergency room or contact your primary medical clinics after hours call service to discuss your concerns.

## 2025-06-23 ENCOUNTER — OFFICE VISIT (OUTPATIENT)
Dept: INTERNAL MEDICINE | Facility: CLINIC | Age: 25
End: 2025-06-23
Payer: COMMERCIAL

## 2025-06-23 VITALS
HEART RATE: 94 BPM | SYSTOLIC BLOOD PRESSURE: 122 MMHG | WEIGHT: 188.69 LBS | HEIGHT: 65 IN | OXYGEN SATURATION: 99 % | DIASTOLIC BLOOD PRESSURE: 84 MMHG | BODY MASS INDEX: 31.44 KG/M2

## 2025-06-23 DIAGNOSIS — Z01.89 ENCOUNTER FOR ROUTINE LABORATORY TESTING: ICD-10-CM

## 2025-06-23 DIAGNOSIS — E66.811 CLASS 1 OBESITY DUE TO EXCESS CALORIES WITH BODY MASS INDEX (BMI) OF 31.0 TO 31.9 IN ADULT, UNSPECIFIED WHETHER SERIOUS COMORBIDITY PRESENT: ICD-10-CM

## 2025-06-23 DIAGNOSIS — E66.09 CLASS 1 OBESITY DUE TO EXCESS CALORIES WITH BODY MASS INDEX (BMI) OF 31.0 TO 31.9 IN ADULT, UNSPECIFIED WHETHER SERIOUS COMORBIDITY PRESENT: ICD-10-CM

## 2025-06-23 DIAGNOSIS — Z00.00 ANNUAL PHYSICAL EXAM: Primary | ICD-10-CM

## 2025-06-23 DIAGNOSIS — R53.83 FATIGUE, UNSPECIFIED TYPE: ICD-10-CM

## 2025-06-23 DIAGNOSIS — Z28.21 VACCINATION REFUSED BY PATIENT: ICD-10-CM

## 2025-06-23 DIAGNOSIS — R68.89 DIFFICULTY LOSING WEIGHT: ICD-10-CM

## 2025-06-23 DIAGNOSIS — J30.89 NON-SEASONAL ALLERGIC RHINITIS, UNSPECIFIED TRIGGER: ICD-10-CM

## 2025-06-23 DIAGNOSIS — R06.00 DYSPNEA, UNSPECIFIED TYPE: ICD-10-CM

## 2025-06-23 PROBLEM — S76.012A STRAIN OF FLEXOR MUSCLE OF LEFT HIP: Status: RESOLVED | Noted: 2017-08-22 | Resolved: 2025-06-23

## 2025-06-23 PROBLEM — J30.9 ALLERGIC RHINITIS: Status: ACTIVE | Noted: 2018-12-28

## 2025-06-23 PROCEDURE — 99999 PR PBB SHADOW E&M-EST. PATIENT-LVL V: CPT | Mod: PBBFAC,,, | Performed by: STUDENT IN AN ORGANIZED HEALTH CARE EDUCATION/TRAINING PROGRAM

## 2025-06-23 PROCEDURE — 99385 PREV VISIT NEW AGE 18-39: CPT | Mod: S$GLB,,, | Performed by: STUDENT IN AN ORGANIZED HEALTH CARE EDUCATION/TRAINING PROGRAM

## 2025-06-23 PROCEDURE — 3008F BODY MASS INDEX DOCD: CPT | Mod: CPTII,S$GLB,, | Performed by: STUDENT IN AN ORGANIZED HEALTH CARE EDUCATION/TRAINING PROGRAM

## 2025-06-23 PROCEDURE — 1160F RVW MEDS BY RX/DR IN RCRD: CPT | Mod: CPTII,S$GLB,, | Performed by: STUDENT IN AN ORGANIZED HEALTH CARE EDUCATION/TRAINING PROGRAM

## 2025-06-23 PROCEDURE — 3079F DIAST BP 80-89 MM HG: CPT | Mod: CPTII,S$GLB,, | Performed by: STUDENT IN AN ORGANIZED HEALTH CARE EDUCATION/TRAINING PROGRAM

## 2025-06-23 PROCEDURE — 3074F SYST BP LT 130 MM HG: CPT | Mod: CPTII,S$GLB,, | Performed by: STUDENT IN AN ORGANIZED HEALTH CARE EDUCATION/TRAINING PROGRAM

## 2025-06-23 PROCEDURE — 1159F MED LIST DOCD IN RCRD: CPT | Mod: CPTII,S$GLB,, | Performed by: STUDENT IN AN ORGANIZED HEALTH CARE EDUCATION/TRAINING PROGRAM

## 2025-06-23 RX ORDER — LORATADINE 10 MG/1
10 TABLET ORAL DAILY
COMMUNITY

## 2025-06-23 RX ORDER — MONTELUKAST SODIUM 10 MG/1
10 TABLET ORAL NIGHTLY
Qty: 90 TABLET | Refills: 3 | Status: SHIPPED | OUTPATIENT
Start: 2025-06-23 | End: 2026-06-18

## 2025-06-23 RX ORDER — LEVOCETIRIZINE DIHYDROCHLORIDE 5 MG/1
5 TABLET, FILM COATED ORAL NIGHTLY
COMMUNITY

## 2025-06-23 RX ORDER — MOMETASONE FUROATE MONOHYDRATE 50 UG/1
2 SPRAY, METERED NASAL DAILY
Qty: 17 G | Refills: 11 | Status: SHIPPED | OUTPATIENT
Start: 2025-06-23 | End: 2026-06-18

## 2025-06-23 NOTE — PROGRESS NOTES
[facility] ANNUAL WELLNESS EXAM      CHIEF COMPLAINT:   Chief Complaint   Patient presents with    Annual Exam       HISTORY OF PRESENT ILLNESS: Yanira Sun is a 25 y.o. female who presents here today for an annual wellness examination. Patient also would like to discuss:    Difficulty losing weight  Weight gain during depressive episode  She is going to the gym 4-5 x week, 1-2 hrs at a time. She lifts weights, core exercises, cardio.   Eating healthy - high protein, low carb, high fiber    Fatigue  Dependent on caffeine. Wants to make sure nothing else going on.     Allergies  Currently both xyzal and Claritin. Would like to restart Singulair. Not currently on nasal spray. Inquiring if she could have asthma.       PAST MEDICAL HISTORY:  No past medical history on file.      MEDICATIONS:    Current Outpatient Medications:     levocetirizine (XYZAL) 5 MG tablet, Take 5 mg by mouth every evening., Disp: , Rfl:     loratadine (CLARITIN) 10 mg tablet, Take 10 mg by mouth once daily., Disp: , Rfl:     montelukast (SINGULAIR) 10 mg tablet, Take 10 mg by mouth every evening. (Patient not taking: Reported on 6/23/2025), Disp: , Rfl:     Current Facility-Administered Medications:     copper intrauterine device 380 square mm  mm, 380 mm, Intrauterine, , Latoya Villa MD, 380 mm at 08/30/23 1045      PAST SURGICAL HISTORY:  No past surgical history on file.      SOCIAL HISTORY:  Social History     Tobacco Use    Smoking status: Never     Passive exposure: Never    Smokeless tobacco: Never   Substance Use Topics    Alcohol use: No    Drug use: Never       ALLERGIES:  Review of patient's allergies indicates:  No Known Allergies      FAMILY HISTORY:  Family History   Problem Relation Name Age of Onset    No Known Problems Mother      No Known Problems Father      Endometriosis Paternal Aunt      Endometriosis Paternal Grandmother      Endometriosis Cousin      Breast cancer Neg Hx      Colon cancer  "Neg Hx      Ovarian cancer Neg Hx      Cervical cancer Neg Hx           HEALTH MAINTENANCE:     IMMUNIZATIONS:                                                              Influenza: defer until next season  COVID: declined  RSV: not indicated  Tdap: last in 2013, updated declined  HPV: declined  Shingles: not indicated  Pneumonia: not indicated    SCREENINGS        Cervical cancer: next due Aug 2026  Breast cancer: not indicated  Colon cancer: not indicated  Lung cancer: not indicated  HIV: ordered  Hepatitis C: ordered  STI: declined  Diabetes: ordered  Lipids: ordered  AAA: not indicated  Bone density: not indicated  Anxiety and depression: denies    LIFESTYLE         Exercise: as in HPI  Diet: as in HPI  Substance use: as above  Employment:   Family & living situation: lives in [place], [facility]     INTERVENTIONS        Folate: no  Contraception: IUD  Statin: no  Aspirin:no        PHYSICAL EXAM:    /84   Pulse 94   Ht 5' 5" (1.651 m)   Wt 85.6 kg (188 lb 11.4 oz)   LMP 06/17/2025 (Approximate)   SpO2 99%   BMI 31.40 kg/m²     Physical Exam  Vitals and nursing note reviewed.   Constitutional:       General: She is not in acute distress.     Appearance: Normal appearance. She is not ill-appearing, toxic-appearing or diaphoretic.   HENT:      Head: Normocephalic and atraumatic.      Nose: Nose normal.   Eyes:      Extraocular Movements: Extraocular movements intact.      Conjunctiva/sclera: Conjunctivae normal.      Pupils: Pupils are equal, round, and reactive to light.   Cardiovascular:      Rate and Rhythm: Normal rate and regular rhythm.      Heart sounds: Normal heart sounds. No murmur heard.  Pulmonary:      Effort: Pulmonary effort is normal. No respiratory distress.      Breath sounds: Normal breath sounds. No wheezing.   Musculoskeletal:         General: No deformity. Normal range of motion.   Skin:     Findings: No lesion or rash.   Neurological:      General: No focal " deficit present.      Mental Status: She is alert.      Motor: No weakness.      Gait: Gait normal.   Psychiatric:         Mood and Affect: Mood normal.         Behavior: Behavior normal.         Thought Content: Thought content normal.         Judgment: Judgment normal.                  ASSESSMENT & PLAN:    1. Annual physical exam  Findings, health screenings, and immunizations as below  -     Ambulatory referral/consult to Gynecology; Future; Expected date: 06/30/2025      2. Fatigue, unspecified type  Assessment & Plan:  Progressive for several months  Labs as detailed to r/o underlying etiology   Orders:  -     CBC Auto Differential; Future; Expected date: 06/23/2025  -     TSH; Future; Expected date: 06/23/2025  -     Vitamin B12; Future; Expected date: 06/23/2025  -     Folate; Future; Expected date: 06/23/2025  -     Iron and TIBC; Future; Expected date: 06/23/2025  -     Ferritin; Future; Expected date: 06/23/2025      3. Class 1 obesity due to excess calories with body mass index (BMI) of 31.0 to 31.9 in adult, unspecified whether serious comorbidity present  4. Difficulty losing weight  Assessment & Plan:  Patient experienced some weight gain when she was going through a depressive period (now resolved) and has had difficulty losing weight since then   Noted 10 lb weight loss since Jan 2025  Labs as detailed to r/o comorbid/contributing conditions  Continue regular exercise  Continue healthy diet  Discussed gradual weight loss is healthy and sustainable   Orders:  -     Hemoglobin A1C; Future; Expected date: 06/23/2025  -     TSH; Future; Expected date: 06/23/2025      5. Non-seasonal allergic rhinitis, unspecified trigger  Assessment & Plan:  Chronic and persistent  Continue Antihistamine daily - choose claritin or xyzal (currently taking both)  Start Montelukast (Singulair) daily - refilled  Start Nasonex - use 1-2 times daily   F/U with Allergy if no improvement   Orders:  -     montelukast (SINGULAIR)  10 mg tablet; Take 1 tablet (10 mg total) by mouth every evening.  Dispense: 90 tablet; Refill: 3  -     mometasone (NASONEX) 50 mcg/actuation nasal spray; 2 sprays by Nasal route once daily.  Dispense: 17 g; Refill: 11  -     Ambulatory referral/consult to Allergy; Future; Expected date: 06/30/2025      6. Dyspnea, unspecified type  Assessment & Plan:  Chronic dyspnea with exertion, coughing  Patient suspects she may have asthma but does not think she's ever been tested  PFT ordered to evaluate further   Orders:  -     Complete PFT with bronchodilator; Future      7. Encounter for routine laboratory testing  -     CBC Auto Differential; Future; Expected date: 06/23/2025  -     Comprehensive Metabolic Panel; Future; Expected date: 06/23/2025  -     Hemoglobin A1C; Future; Expected date: 06/23/2025  -     Lipid Panel; Future; Expected date: 06/23/2025  -     HIV 1/2 Ag/Ab (4th Gen); Future; Expected date: 06/23/2025  -     Hepatitis C Antibody; Future; Expected date: 06/23/2025      8. Vaccination refused by patient  Due for Tetanus/Tdap, covid, hpv - declined today             Sondra Starr MD  [facility]  06/23/2025

## 2025-06-23 NOTE — ASSESSMENT & PLAN NOTE
Chronic dyspnea with exertion, coughing  Patient suspects she may have asthma but does not think she's ever been tested  PFT ordered to evaluate further

## 2025-06-23 NOTE — PATIENT INSTRUCTIONS
Labs have been ordered. Please have completed at your earliest convenience when fasting (no food or beverage besides water for at least 8 hours).     For allergies:  - claritin OR xyzal, try to avoid taking both   - montelukast (singulair) refilled  - nasonex nasal spray - use daily  - referral to allergy - schedule at checkout, online, or call [phone]    For shortness of breath:  - pulmonary function test - schedule at checkout, online, or call [phone]    Referral to Gynecology - schedule at checkout, online, or call [phone].     I recommend you have the Tetanus (Tdap), HPV, and COVID vaccination(s). You may return to [facility] or have completed at an outside pharmacy at any time.

## 2025-06-23 NOTE — ASSESSMENT & PLAN NOTE
Chronic and persistent  Continue Antihistamine daily - choose claritin or xyzal (currently taking both)  Start Montelukast (Singulair) daily - refilled  Start Nasonex - use 1-2 times daily   F/U with Allergy if no improvement

## 2025-06-23 NOTE — ASSESSMENT & PLAN NOTE
Patient experienced some weight gain when she was going through a depressive period (now resolved) and has had difficulty losing weight since then   Noted 10 lb weight loss since Jan 2025  Labs as detailed to r/o comorbid/contributing conditions  Continue regular exercise  Continue healthy diet  Discussed gradual weight loss is healthy and sustainable

## 2025-06-27 ENCOUNTER — RESULTS FOLLOW-UP (OUTPATIENT)
Dept: INTERNAL MEDICINE | Facility: CLINIC | Age: 25
End: 2025-06-27

## 2025-06-27 ENCOUNTER — LAB VISIT (OUTPATIENT)
Dept: LAB | Facility: HOSPITAL | Age: 25
End: 2025-06-27
Payer: COMMERCIAL

## 2025-06-27 DIAGNOSIS — R53.83 FATIGUE, UNSPECIFIED TYPE: ICD-10-CM

## 2025-06-27 DIAGNOSIS — R68.89 DIFFICULTY LOSING WEIGHT: ICD-10-CM

## 2025-06-27 DIAGNOSIS — Z01.89 ENCOUNTER FOR ROUTINE LABORATORY TESTING: ICD-10-CM

## 2025-06-27 LAB
ABSOLUTE EOSINOPHIL (OHS): 0.23 K/UL
ABSOLUTE MONOCYTE (OHS): 0.65 K/UL (ref 0.3–1)
ABSOLUTE NEUTROPHIL COUNT (OHS): 3.69 K/UL (ref 1.8–7.7)
ALBUMIN SERPL BCP-MCNC: 4.3 G/DL (ref 3.5–5.2)
ALP SERPL-CCNC: 67 UNIT/L (ref 40–150)
ALT SERPL W/O P-5'-P-CCNC: 21 UNIT/L (ref 10–44)
ANION GAP (OHS): 8 MMOL/L (ref 8–16)
AST SERPL-CCNC: 22 UNIT/L (ref 11–45)
BASOPHILS # BLD AUTO: 0.07 K/UL
BASOPHILS NFR BLD AUTO: 0.9 %
BILIRUB SERPL-MCNC: 0.7 MG/DL (ref 0.1–1)
BUN SERPL-MCNC: 14 MG/DL (ref 6–20)
CALCIUM SERPL-MCNC: 9.6 MG/DL (ref 8.7–10.5)
CHLORIDE SERPL-SCNC: 107 MMOL/L (ref 95–110)
CHOLEST SERPL-MCNC: 147 MG/DL (ref 120–199)
CHOLEST/HDLC SERPL: 3.3 {RATIO} (ref 2–5)
CO2 SERPL-SCNC: 22 MMOL/L (ref 23–29)
CREAT SERPL-MCNC: 0.7 MG/DL (ref 0.5–1.4)
EAG (OHS): 91 MG/DL (ref 68–131)
ERYTHROCYTE [DISTWIDTH] IN BLOOD BY AUTOMATED COUNT: 12.4 % (ref 11.5–14.5)
FERRITIN SERPL-MCNC: 92 NG/ML (ref 20–300)
FOLATE SERPL-MCNC: 10.8 NG/ML (ref 4–24)
GFR SERPLBLD CREATININE-BSD FMLA CKD-EPI: >60 ML/MIN/1.73/M2
GLUCOSE SERPL-MCNC: 76 MG/DL (ref 70–110)
HBA1C MFR BLD: 4.8 % (ref 4–5.6)
HCT VFR BLD AUTO: 39.2 % (ref 37–48.5)
HCV AB SERPL QL IA: NORMAL
HDLC SERPL-MCNC: 44 MG/DL (ref 40–75)
HDLC SERPL: 29.9 % (ref 20–50)
HGB BLD-MCNC: 13.3 GM/DL (ref 12–16)
HIV 1+2 AB+HIV1 P24 AG SERPL QL IA: NORMAL
IMM GRANULOCYTES # BLD AUTO: 0.01 K/UL (ref 0–0.04)
IMM GRANULOCYTES NFR BLD AUTO: 0.1 % (ref 0–0.5)
IRON SATN MFR SERPL: 24 % (ref 20–50)
IRON SERPL-MCNC: 107 UG/DL (ref 30–160)
LDLC SERPL CALC-MCNC: 86.2 MG/DL (ref 63–159)
LYMPHOCYTES # BLD AUTO: 3.09 K/UL (ref 1–4.8)
MCH RBC QN AUTO: 28.7 PG (ref 27–31)
MCHC RBC AUTO-ENTMCNC: 33.9 G/DL (ref 32–36)
MCV RBC AUTO: 85 FL (ref 82–98)
NONHDLC SERPL-MCNC: 103 MG/DL
NUCLEATED RBC (/100WBC) (OHS): 0 /100 WBC
PLATELET # BLD AUTO: 318 K/UL (ref 150–450)
PMV BLD AUTO: 9.4 FL (ref 9.2–12.9)
POTASSIUM SERPL-SCNC: 4.6 MMOL/L (ref 3.5–5.1)
PROT SERPL-MCNC: 7.9 GM/DL (ref 6–8.4)
RBC # BLD AUTO: 4.63 M/UL (ref 4–5.4)
RELATIVE EOSINOPHIL (OHS): 3 %
RELATIVE LYMPHOCYTE (OHS): 39.9 % (ref 18–48)
RELATIVE MONOCYTE (OHS): 8.4 % (ref 4–15)
RELATIVE NEUTROPHIL (OHS): 47.7 % (ref 38–73)
SODIUM SERPL-SCNC: 137 MMOL/L (ref 136–145)
TIBC SERPL-MCNC: 447 UG/DL (ref 250–450)
TRANSFERRIN SERPL-MCNC: 302 MG/DL (ref 200–375)
TRIGL SERPL-MCNC: 84 MG/DL (ref 30–150)
TSH SERPL-ACNC: 2.2 UIU/ML (ref 0.4–4)
VIT B12 SERPL-MCNC: 547 PG/ML (ref 210–950)
WBC # BLD AUTO: 7.74 K/UL (ref 3.9–12.7)

## 2025-06-27 PROCEDURE — 86803 HEPATITIS C AB TEST: CPT

## 2025-06-27 PROCEDURE — 82607 VITAMIN B-12: CPT

## 2025-06-27 PROCEDURE — 82247 BILIRUBIN TOTAL: CPT

## 2025-06-27 PROCEDURE — 85025 COMPLETE CBC W/AUTO DIFF WBC: CPT

## 2025-06-27 PROCEDURE — 83718 ASSAY OF LIPOPROTEIN: CPT

## 2025-06-27 PROCEDURE — 82728 ASSAY OF FERRITIN: CPT

## 2025-06-27 PROCEDURE — 83036 HEMOGLOBIN GLYCOSYLATED A1C: CPT

## 2025-06-27 PROCEDURE — 36415 COLL VENOUS BLD VENIPUNCTURE: CPT

## 2025-06-27 PROCEDURE — 84443 ASSAY THYROID STIM HORMONE: CPT

## 2025-06-27 PROCEDURE — 82746 ASSAY OF FOLIC ACID SERUM: CPT

## 2025-06-27 PROCEDURE — 83540 ASSAY OF IRON: CPT

## 2025-06-27 PROCEDURE — 87389 HIV-1 AG W/HIV-1&-2 AB AG IA: CPT

## 2025-07-11 ENCOUNTER — OFFICE VISIT (OUTPATIENT)
Dept: OBSTETRICS AND GYNECOLOGY | Facility: CLINIC | Age: 25
End: 2025-07-11
Payer: COMMERCIAL

## 2025-07-11 VITALS
WEIGHT: 190.06 LBS | SYSTOLIC BLOOD PRESSURE: 113 MMHG | DIASTOLIC BLOOD PRESSURE: 76 MMHG | BODY MASS INDEX: 31.62 KG/M2

## 2025-07-11 DIAGNOSIS — Z11.51 ENCOUNTER FOR SCREENING FOR HUMAN PAPILLOMAVIRUS (HPV): Primary | ICD-10-CM

## 2025-07-11 DIAGNOSIS — Z01.419 WELL WOMAN EXAM WITH ROUTINE GYNECOLOGICAL EXAM: ICD-10-CM

## 2025-07-11 DIAGNOSIS — N94.6 DYSMENORRHEA: ICD-10-CM

## 2025-07-11 DIAGNOSIS — Z30.431 IUD CHECK UP: ICD-10-CM

## 2025-07-11 PROCEDURE — 99999 PR PBB SHADOW E&M-EST. PATIENT-LVL III: CPT | Mod: PBBFAC,,, | Performed by: OBSTETRICS & GYNECOLOGY

## 2025-07-11 RX ORDER — IBUPROFEN 600 MG/1
600 TABLET, FILM COATED ORAL EVERY 6 HOURS PRN
Qty: 60 TABLET | Refills: 2 | Status: SHIPPED | OUTPATIENT
Start: 2025-07-11 | End: 2026-07-11

## 2025-07-11 NOTE — PROGRESS NOTES
CC: Annual check-up    SUBJECTIVE:   25 y.o. female   for annual routine Pap and checkup. Patient's last menstrual period was 2025 (approximate)..  She has no unusual complaints.        History reviewed. No pertinent past medical history.  History reviewed. No pertinent surgical history.  Social History[1]  Family History   Problem Relation Name Age of Onset    Depression Mother Cristine     Learning disabilities Mother Cristine     Mental illness Mother Cristine     Depression Father David     Learning disabilities Father David     Endometriosis Paternal Aunt Ruma     Depression Paternal Aunt Ruma     Miscarriages / Stillbirths Paternal Aunt Ruma     Endometriosis Paternal Grandmother      Endometriosis Cousin      Diabetes Paternal Grandfather Ignacio     Alcohol abuse Maternal Uncle Justo     Depression Maternal Uncle Justo     Drug abuse Maternal Uncle Justo     Depression Paternal Cousin Michael     Miscarriages / Stillbirths Paternal Cousin Michael     Miscarriages / Stillbirths Paternal Aunt Oma     Breast cancer Neg Hx      Colon cancer Neg Hx      Ovarian cancer Neg Hx      Cervical cancer Neg Hx       OB History    Para Term  AB Living   0 0 0 0 0 0   SAB IAB Ectopic Multiple Live Births   0 0 0 0 0         Current Medications[2]  Allergies: Patient has no known allergies.     ROS:  Constitutional: no weight loss, weight gain, fever, fatigue  Eyes:  No vision changes, glasses/contacts  ENT/Mouth: No ulcers, sinus problems, ears ringing, headache  Cardiovascular: No inability to lie flat, chest pain, exercise intolerance, swelling, heart palpitations  Respiratory: No wheezing, coughing blood, shortness of breath, or cough  Gastrointestinal: No diarrhea, bloody stool, nausea/vomiting, constipation, gas, hemorrhoids  Genitourinary: No blood in urine, painful urination, urgency of urination, frequency of urination, incomplete emptying, incontinence, abnormal bleeding, painful  periods, heavy periods, vaginal discharge, vaginal odor, painful intercourse, sexual problems, bleeding after intercourse.  Musculoskeletal: No muscle weakness  Skin/Breast: No painful breasts, nipple discharge, masses, rash, ulcers  Neurological: No passing out, seizures, numbness, headache  Endocrine: No diabetes, hypothyroid, hyperthyroid, hot flashes, hair loss, abnormal hair growth, ance  Psychiatric: No depression, crying  Hematologic: No bruises, bleeding, swollen lymph nodes, anemia.      OBJECTIVE:   The patient appears well, alert, oriented x 3, in no distress.  /76   Wt 86.2 kg (190 lb 0.6 oz)   LMP 07/04/2025 (Approximate)   BMI 31.62 kg/m²   NECK: no thyromegaly, trachea midline  SKIN: no acne, striae, hirsutism  BREAST EXAM: not examined  ABDOMEN: no hernias, masses, or hepatosplenomegaly  GENITALIA: normal external genitalia, no erythema, no discharge  URETHRA: normal urethra, normal urethral meatus  VAGINA: Normal  CERVIX: no lesions or cervical motion tenderness and paraguard strings present, physiological discharge present  UTERUS: normal  ADNEXA: normal adnexa      ASSESSMENT:   well woman  No diagnosis found.    PLAN:   pap smear  return annually or prn            [1]   Social History  Socioeconomic History    Marital status: Significant Other   Occupational History    Occupation:    Tobacco Use    Smoking status: Never     Passive exposure: Never    Smokeless tobacco: Never   Vaping Use    Vaping status: Former    Substances: Nicotine   Substance and Sexual Activity    Alcohol use: Yes     Comment: very minimal - less than monthly    Drug use: Never    Sexual activity: Yes     Partners: Male     Birth control/protection: I.U.D.   Social History Narrative    ** Merged History Encounter **          Social Drivers of Health     Financial Resource Strain: Low Risk  (6/20/2025)    Overall Financial Resource Strain (CARDIA)     Difficulty of Paying Living Expenses: Not very  hard   Food Insecurity: Food Insecurity Present (6/20/2025)    Hunger Vital Sign     Worried About Running Out of Food in the Last Year: Sometimes true     Ran Out of Food in the Last Year: Never true   Transportation Needs: No Transportation Needs (6/20/2025)    PRAPARE - Transportation     Lack of Transportation (Medical): No     Lack of Transportation (Non-Medical): No   Physical Activity: Sufficiently Active (6/20/2025)    Exercise Vital Sign     Days of Exercise per Week: 5 days     Minutes of Exercise per Session: 60 min   Stress: Stress Concern Present (6/20/2025)    [employer] - Occupational Stress Questionnaire     Feeling of Stress : To some extent   Housing Stability: Low Risk  (6/20/2025)    Housing Stability Vital Sign     Unable to Pay for Housing in the Last Year: No     Number of Times Moved in the Last Year: 0     Homeless in the Last Year: No   [2]   Current Outpatient Medications   Medication Sig Dispense Refill    loratadine (CLARITIN) 10 mg tablet Take 10 mg by mouth once daily.      mometasone (NASONEX) 50 mcg/actuation nasal spray 2 sprays by Nasal route once daily. 17 g 11    montelukast (SINGULAIR) 10 mg tablet Take 1 tablet (10 mg total) by mouth every evening. 90 tablet 3    levocetirizine (XYZAL) 5 MG tablet Take 5 mg by mouth every evening. (Patient not taking: Reported on 7/11/2025)       Current Facility-Administered Medications   Medication Dose Route Frequency Provider Last Rate Last Admin    copper intrauterine device 380 square mm  mm  380 mm Intrauterine  Latoya Villa MD   380 mm at 08/30/23 6116

## 2025-07-29 ENCOUNTER — OFFICE VISIT (OUTPATIENT)
Dept: URGENT CARE | Facility: CLINIC | Age: 25
End: 2025-07-29
Payer: COMMERCIAL

## 2025-07-29 VITALS
HEIGHT: 65 IN | TEMPERATURE: 98 F | BODY MASS INDEX: 30.82 KG/M2 | SYSTOLIC BLOOD PRESSURE: 120 MMHG | HEART RATE: 96 BPM | RESPIRATION RATE: 16 BRPM | DIASTOLIC BLOOD PRESSURE: 80 MMHG | WEIGHT: 185 LBS | OXYGEN SATURATION: 99 %

## 2025-07-29 DIAGNOSIS — J02.0 STREP PHARYNGITIS: Primary | ICD-10-CM

## 2025-07-29 DIAGNOSIS — J02.9 SORE THROAT: ICD-10-CM

## 2025-07-29 LAB
CTP QC/QA: YES
CTP QC/QA: YES
MOLECULAR STREP A: POSITIVE
SARS-COV+SARS-COV-2 AG RESP QL IA.RAPID: NEGATIVE

## 2025-07-29 PROCEDURE — 87811 SARS-COV-2 COVID19 W/OPTIC: CPT | Mod: QW,S$GLB,, | Performed by: PHYSICIAN ASSISTANT

## 2025-07-29 PROCEDURE — 87651 STREP A DNA AMP PROBE: CPT | Mod: QW,S$GLB,, | Performed by: PHYSICIAN ASSISTANT

## 2025-07-29 PROCEDURE — 99213 OFFICE O/P EST LOW 20 MIN: CPT | Mod: S$GLB,,, | Performed by: PHYSICIAN ASSISTANT

## 2025-07-29 RX ORDER — AMOXICILLIN 500 MG/1
500 TABLET, FILM COATED ORAL EVERY 12 HOURS
Qty: 20 TABLET | Refills: 0 | Status: SHIPPED | OUTPATIENT
Start: 2025-07-29 | End: 2025-08-08

## 2025-07-29 NOTE — PATIENT INSTRUCTIONS
Be sure to take all of the prescribed antibiotics until completed. Change your toothbrush after being on the antibiotics for 24 hours.     Thank you for choosing [facility]!     Our goal in the Urgent Care is to always provide outstanding medical care. You may receive a survey by mail or e-mail in the next week regarding your experience today. We would greatly appreciate you completing and returning the survey. Your feedback provides us with a way to recognize our staff who provide very good care, and it helps us learn how to improve when your experience was below our aspiration of excellence.       We appreciate you trusting us with your medical care. We hope you feel better soon. We will be happy to take care of you for all of your future medical needs.    You must understand that you've received an Urgent Care treatment only and that you may be released before all your medical problems are known or treated. You, the patient, will arrange for follow up care as instructed.      Follow up with your PCP or specialty clinic as instructed in the next 2-3 days if not improved or as needed. You can call [phone] to schedule an appointment with appropriate provider.      If you condition worsens, we recommend that you receive another evaluation at the emergency room immediately or contact your primary medical clinic's after hours call service to discuss your concerns.      Please return here or go to the Emergency Department for any concerns or worsening condition.

## 2025-07-29 NOTE — PROGRESS NOTES
"Subjective:      Patient ID: Yanira Sun is a 25 y.o. female.    Vitals:  height is 5' 5" (1.651 m) and weight is 83.9 kg (185 lb). Her oral temperature is 98.4 °F (36.9 °C). Her blood pressure is 120/80 and her pulse is 96. Her respiration is 16 and oxygen saturation is 99%.     Chief Complaint: Sore Throat and Fever    Patient provider note starts here:  Patient presents to the clinic with complaints of having a sore throat, fever, chills, body aches and swollen glands in her neck.  Reports she was recently exposed to strep pharyngitis and she has concerned that she now has strep.  Reports that she has had symptoms for 2 nights now. She denies cough or nasal congestion.     Sore Throat   This is a new problem. The current episode started in the past 7 days (x3 days). The problem has been gradually worsening. There has been no fever (102.3 at home last night). The fever has been present for Less than 1 day. The pain is at a severity of 2/10. The pain is mild. Associated symptoms include a hoarse voice, neck pain (when turning head to side), swollen glands and trouble swallowing. Pertinent negatives include no abdominal pain, congestion, coughing, diarrhea, ear discharge, ear pain, headaches, plugged ear sensation, shortness of breath or vomiting. She has had exposure to strep. She has tried acetaminophen and NSAIDs (tylenol last taken at 11:00 am today) for the symptoms.   Fever   Associated symptoms include a sore throat. Pertinent negatives include no abdominal pain, chest pain, congestion, coughing, diarrhea, ear pain, headaches, vomiting or wheezing.       Constitution: Positive for chills and fever.   HENT:  Positive for sore throat and trouble swallowing. Negative for ear pain, ear discharge and congestion.    Neck: Positive for neck pain (when turning head to side).   Cardiovascular:  Negative for chest pain, palpitations and sob on exertion.   Respiratory:  Negative for chest tightness, cough, shortness of " breath and wheezing.    Gastrointestinal:  Negative for abdominal pain, vomiting and diarrhea.   Musculoskeletal:  Positive for muscle ache.   Skin:  Negative for color change and wound.   Neurological:  Negative for headaches, numbness and tingling.      Objective:     Physical Exam   Constitutional: She is oriented to person, place, and time. She appears well-developed. She is cooperative.  Non-toxic appearance. She does not appear ill. No distress.   HENT:   Head: Normocephalic and atraumatic.   Ears:   Right Ear: Hearing, tympanic membrane, external ear and ear canal normal.   Left Ear: Hearing, tympanic membrane, external ear and ear canal normal.   Nose: Nose normal. No mucosal edema, rhinorrhea, nasal deformity or congestion. No epistaxis. Right sinus exhibits no maxillary sinus tenderness and no frontal sinus tenderness. Left sinus exhibits no maxillary sinus tenderness and no frontal sinus tenderness.   Mouth/Throat: Uvula is midline and mucous membranes are normal. No trismus in the jaw. Normal dentition. No uvula swelling. Posterior oropharyngeal erythema present. No oropharyngeal exudate or posterior oropharyngeal edema.   Eyes: Conjunctivae and lids are normal. No scleral icterus.   Neck: Trachea normal and phonation normal. Neck supple. No edema present. No erythema present. No neck rigidity present.   Cardiovascular: Normal rate, regular rhythm, normal heart sounds and normal pulses.   Pulmonary/Chest: Effort normal and breath sounds normal. No respiratory distress. She has no decreased breath sounds. She has no wheezes. She has no rhonchi.   Abdominal: Normal appearance.   Musculoskeletal: Normal range of motion.         General: No deformity. Normal range of motion.   Lymphadenopathy:     She has cervical adenopathy.   Neurological: She is alert and oriented to person, place, and time. She exhibits normal muscle tone. Coordination normal.   Skin: Skin is warm, dry, intact, not diaphoretic and not  pale.   Psychiatric: Her speech is normal and behavior is normal. Judgment and thought content normal.   Nursing note and vitals reviewed.      Assessment:     1. Strep pharyngitis    2. Sore throat      Results for orders placed or performed in visit on 07/29/25   POCT Strep A, Molecular    Collection Time: 07/29/25  6:31 PM   Result Value Ref Range    Molecular Strep A, POC Positive (A) Negative     Acceptable Yes    SARS Coronavirus 2 Antigen, POCT Manual Read    Collection Time: 07/29/25  6:33 PM   Result Value Ref Range    SARS Coronavirus 2 Antigen Negative Negative, Presumptive Negative     Acceptable Yes        Plan:       Strep pharyngitis  -     amoxicillin (AMOXIL) 500 MG Tab; Take 1 tablet (500 mg total) by mouth every 12 (twelve) hours. for 10 days  Dispense: 20 tablet; Refill: 0    Sore throat  -     SARS Coronavirus 2 Antigen, POCT Manual Read  -     POCT Strep A, Molecular          Medical Decision Making:   History:   Old Medical Records: I decided to obtain old medical records.  Old Records Summarized: records from clinic visits.  Clinical Tests:   Lab Tests: Ordered and Reviewed  Urgent Care Management:  A. Problem List:   -Acute: Strep pharyngitis    -Chronic: None  B. Differential diagnosis: Viral pharyngitis, strep pharyngitis, allergic rhinitis, post nasal drip, retropharyngeal abscess, peritonsillar abscess  C. Diagnostic Testing Ordered: COVID, Strep    D. Diagnostic Testing Considered: None  E. Independent Historians: None  F. Urgent Care Midlevel Independent Results Interpretation: Strep positive, COVID negative   G. Radiology:  H. Review of Previous Medical Records: Recently evaluated by PCP for fatigue but labs were reviewed and nothing acute found to explain patient's fatigue.   I. Home Medications Reviewed  J. Social Determinants of Health considered  K. Medical Decision Making and Disposition:  Patient presents to the clinic with complaints of having a  sore throat, swollen neck glands and increased pain with swallowing with known exposure to strep pharyngitis.  On exam, she is afebrile and nontoxic in appearance.  Lungs are clear to auscultation bilaterally and vital signs are stable.  Posterior oropharyngeal erythema is noted with palpable cervical adenopathy.  She was advised follow up closely with primary care and obtain a new toothbrush after being on the antibiotics for 24 hours.  ED precautions discussed.  She verbalized understanding and agreed with this plan.           Patient Instructions   Be sure to take all of the prescribed antibiotics until completed. Change your toothbrush after being on the antibiotics for 24 hours.     Thank you for choosing [facility]!     Our goal in the Urgent Care is to always provide outstanding medical care. You may receive a survey by mail or e-mail in the next week regarding your experience today. We would greatly appreciate you completing and returning the survey. Your feedback provides us with a way to recognize our staff who provide very good care, and it helps us learn how to improve when your experience was below our aspiration of excellence.       We appreciate you trusting us with your medical care. We hope you feel better soon. We will be happy to take care of you for all of your future medical needs.    You must understand that you've received an Urgent Care treatment only and that you may be released before all your medical problems are known or treated. You, the patient, will arrange for follow up care as instructed.      Follow up with your PCP or specialty clinic as instructed in the next 2-3 days if not improved or as needed. You can call [phone] to schedule an appointment with appropriate provider.      If you condition worsens, we recommend that you receive another evaluation at the emergency room immediately or contact your primary medical clinic's after hours call service to discuss your  concerns.      Please return here or go to the Emergency Department for any concerns or worsening condition.